# Patient Record
Sex: FEMALE | Race: OTHER | Employment: UNEMPLOYED | URBAN - METROPOLITAN AREA
[De-identification: names, ages, dates, MRNs, and addresses within clinical notes are randomized per-mention and may not be internally consistent; named-entity substitution may affect disease eponyms.]

---

## 2023-02-08 ENCOUNTER — APPOINTMENT (OUTPATIENT)
Dept: CT IMAGING | Age: 48
End: 2023-02-08

## 2023-02-08 ENCOUNTER — HOSPITAL ENCOUNTER (EMERGENCY)
Age: 48
Discharge: HOME HEALTH | End: 2023-02-08

## 2023-02-08 VITALS
SYSTOLIC BLOOD PRESSURE: 138 MMHG | OXYGEN SATURATION: 99 % | HEART RATE: 89 BPM | DIASTOLIC BLOOD PRESSURE: 83 MMHG | RESPIRATION RATE: 18 BRPM | TEMPERATURE: 98 F

## 2023-02-08 DIAGNOSIS — E04.1 THYROID NODULE: Primary | ICD-10-CM

## 2023-02-08 DIAGNOSIS — D50.9 MICROCYTIC ANEMIA: ICD-10-CM

## 2023-02-08 LAB
ALBUMIN SERPL-MCNC: 4.3 GM/DL (ref 3.4–5)
ALP BLD-CCNC: 87 IU/L (ref 40–128)
ALT SERPL-CCNC: 19 U/L (ref 10–40)
ANION GAP SERPL CALCULATED.3IONS-SCNC: 9 MMOL/L (ref 4–16)
AST SERPL-CCNC: 25 IU/L (ref 15–37)
BASOPHILS ABSOLUTE: 0 K/CU MM
BASOPHILS RELATIVE PERCENT: 0.4 % (ref 0–1)
BILIRUB SERPL-MCNC: 0.2 MG/DL (ref 0–1)
BUN SERPL-MCNC: 9 MG/DL (ref 6–23)
CALCIUM SERPL-MCNC: 9.3 MG/DL (ref 8.3–10.6)
CHLORIDE BLD-SCNC: 101 MMOL/L (ref 99–110)
CHP ED QC CHECK: NORMAL
CO2: 27 MMOL/L (ref 21–32)
CREAT SERPL-MCNC: 0.8 MG/DL (ref 0.6–1.1)
DIFFERENTIAL TYPE: ABNORMAL
EOSINOPHILS ABSOLUTE: 0.1 K/CU MM
EOSINOPHILS RELATIVE PERCENT: 1.8 % (ref 0–3)
GFR SERPL CREATININE-BSD FRML MDRD: >60 ML/MIN/1.73M2
GLUCOSE SERPL-MCNC: 120 MG/DL (ref 70–99)
HCT VFR BLD CALC: 34.9 % (ref 37–47)
HEMOGLOBIN: 10.2 GM/DL (ref 12.5–16)
IMMATURE NEUTROPHIL %: 0.2 % (ref 0–0.43)
LYMPHOCYTES ABSOLUTE: 2.1 K/CU MM
LYMPHOCYTES RELATIVE PERCENT: 38.4 % (ref 24–44)
MCH RBC QN AUTO: 22.5 PG (ref 27–31)
MCHC RBC AUTO-ENTMCNC: 29.2 % (ref 32–36)
MCV RBC AUTO: 76.9 FL (ref 78–100)
MONOCYTES ABSOLUTE: 0.5 K/CU MM
MONOCYTES RELATIVE PERCENT: 9.3 % (ref 0–4)
NUCLEATED RBC %: 0 %
PDW BLD-RTO: 18.4 % (ref 11.7–14.9)
PLATELET # BLD: 219 K/CU MM (ref 140–440)
PMV BLD AUTO: 10.2 FL (ref 7.5–11.1)
POTASSIUM SERPL-SCNC: 3.8 MMOL/L (ref 3.5–5.1)
PREGNANCY TEST URINE, POC: NEGATIVE
PREGNANCY TEST URINE, POC: NEGATIVE
RBC # BLD: 4.54 M/CU MM (ref 4.2–5.4)
SEGMENTED NEUTROPHILS ABSOLUTE COUNT: 2.8 K/CU MM
SEGMENTED NEUTROPHILS RELATIVE PERCENT: 49.9 % (ref 36–66)
SODIUM BLD-SCNC: 137 MMOL/L (ref 135–145)
TOTAL IMMATURE NEUTOROPHIL: 0.01 K/CU MM
TOTAL NUCLEATED RBC: 0 K/CU MM
TOTAL PROTEIN: 7.8 GM/DL (ref 6.4–8.2)
TSH SERPL DL<=0.005 MIU/L-ACNC: 0.67 UIU/ML (ref 0.27–4.2)
WBC # BLD: 5.6 K/CU MM (ref 4–10.5)

## 2023-02-08 PROCEDURE — 85025 COMPLETE CBC W/AUTO DIFF WBC: CPT

## 2023-02-08 PROCEDURE — 6360000004 HC RX CONTRAST MEDICATION: Performed by: PHYSICIAN ASSISTANT

## 2023-02-08 PROCEDURE — 99285 EMERGENCY DEPT VISIT HI MDM: CPT

## 2023-02-08 PROCEDURE — 84443 ASSAY THYROID STIM HORMONE: CPT

## 2023-02-08 PROCEDURE — 70491 CT SOFT TISSUE NECK W/DYE: CPT

## 2023-02-08 PROCEDURE — 80053 COMPREHEN METABOLIC PANEL: CPT

## 2023-02-08 PROCEDURE — 81025 URINE PREGNANCY TEST: CPT

## 2023-02-08 RX ADMIN — IOPAMIDOL 80 ML: 755 INJECTION, SOLUTION INTRAVENOUS at 12:33

## 2023-02-08 ASSESSMENT — ENCOUNTER SYMPTOMS
VOICE CHANGE: 0
TROUBLE SWALLOWING: 0
SORE THROAT: 0
RHINORRHEA: 0

## 2023-02-08 NOTE — DISCHARGE INSTRUCTIONS
Your CAT scan findings today do show a 3.5 cm nodule in your thyroid. This needs further evaluation to determine if this is cancer or not cancer. If there is cancer, this can be treated. Please follow-up with  the cancer center to  schedule an outpatient ultrasound of your thyroid. Follow-up with a primary care provider this weekfor reevaluation of your symptoms and discuss further outpatient management of further healthcare needs. Return to emergency department if you develop any neck swelling, difficulty breathing, difficulty swallowing, fevers, worsening symptoms or any new concerns.

## 2023-02-08 NOTE — ED PROVIDER NOTES
[address] ENCOUNTER        Pt Name: Rusty Kimbrough  MRN: [id number]  Rufinagfelisa 1975  Date of evaluation: 2/8/2023  Provider: Rtio Pearson PA-C  PCP: No primary care provider on file. BRIANA. I have evaluated this patient. My supervising physician was available for consultation. CHIEF COMPLAINT       Chief Complaint   Patient presents with    Other     Thyroid swelling       HISTORY OF PRESENT ILLNESS: 1 or more Elements     History from : Patient    Limitations to history : Language- [place] Creole Speaking    Rusty Kimbrough is a 56 y.o. female who presents c/o of mass in neck. She was advised to come in today by her , due to issue with her neck. She mentions it has been there 7-8 years, mentioning that she came in today because she did not have to work. She describes it as feeling it from inside. She feels it when she swallows. Has noticed it going up and down when she swallows. She feels that it is getting bigger gradually. She has not noticed any throat swelling. Has not had any previous evaluations. Mom had similar situation and had to have surgery. She denies any hoarseness, changes to her voice, difficulty swallowing, sore throat, neck pain, facial swelling, difficulty breathing, chest pain, skin rashes or lesions, extremity weakness, numbness tingling swelling, fevers, night sweats, weight loss, fatigue    LMP: last month  Meds: takes multivitamin,  Denies any PMHX  SocHx: denies smoking; etoh beer on weekends occasionally       Nursing Notes were all reviewed and agreed with or any disagreements were addressed in the HPI. REVIEW OF SYSTEMS :      Review of Systems   Constitutional:  Negative for fever and unexpected weight change. HENT:  Negative for rhinorrhea, sore throat, trouble swallowing and voice change.     Eyes:  Negative for visual disturbance. Musculoskeletal:  Negative for joint swelling and myalgias. Skin:  Negative for rash. Neurological:  Negative for speech difficulty, weakness and headaches. Hematological:  Negative for adenopathy. Positives and Pertinent negatives as per HPI. SURGICAL HISTORY   No past surgical history on file. CURRENTMEDICATIONS       Previous Medications    No medications on file       ALLERGIES     Patient has no known allergies. FAMILYHISTORY     No family history on file. SOCIAL HISTORY       Social History     Tobacco Use    Smoking status: Never    Smokeless tobacco: Never   Substance Use Topics    Alcohol use: Yes     Comment: weekends    Drug use: Never       SCREENINGS        Avinger Coma Scale  Eye Opening: Spontaneous  Best Verbal Response: Oriented  Best Motor Response: Obeys commands  Avinger Coma Scale Score: 15                CIWA Assessment  BP: 138/83  Heart Rate: 89               PHYSICAL EXAM  1 or more Elements     ED Triage Vitals [02/08/23 1011]   BP Temp Temp Source Heart Rate Resp SpO2 Height Weight   (!) 141/84 98 °F (36.7 °C) Oral (!) 105 18 100 % -- --       Physical Exam  Vitals and nursing note reviewed. Constitutional:       Appearance: Normal appearance. She is well-developed. She is not ill-appearing or diaphoretic. HENT:      Head: Normocephalic and atraumatic. Eyes:      General: No scleral icterus. Right eye: No discharge. Left eye: No discharge. Neck:      Thyroid: Thyroid mass present. Vascular: No hepatojugular reflux. Trachea: Trachea and phonation normal.   Cardiovascular:      Rate and Rhythm: Normal rate and regular rhythm. Heart sounds: Normal heart sounds. No murmur heard. No friction rub. No gallop. Pulmonary:      Effort: Pulmonary effort is normal. No respiratory distress. Breath sounds: Normal breath sounds. No stridor. No wheezing or rales. Chest:      Chest wall: No tenderness.    Abdominal: General: Bowel sounds are normal. There is no distension. Palpations: Abdomen is soft. There is no mass. Tenderness: There is no abdominal tenderness. There is no guarding or rebound. Musculoskeletal:         General: No tenderness. Normal range of motion. Cervical back: Full passive range of motion without pain and neck supple. No rigidity, torticollis or crepitus. No spinous process tenderness. Lymphadenopathy:      Cervical: No cervical adenopathy. Skin:     General: Skin is warm and dry. Coloration: Skin is not pale. Neurological:      Mental Status: She is alert and oriented to person, place, and time. Coordination: Coordination normal.   Psychiatric:         Mood and Affect: Mood normal.         Behavior: Behavior normal.       EMERGENCY DEPARTMENT COURSE and DIFFERENTIAL DIAGNOSIS/MDM:     Vitals:    02/08/23 1011 02/08/23 1042   BP: (!) 141/84 138/83   Pulse: (!) 105 89   Resp: 18    Temp: 98 °F (36.7 °C)    TempSrc: Oral    SpO2: 100% 99%       Pt is a 56 y.o. female who presents with above HPI. Nursing notes and vital signs reviewed. Patient speaks [employer], video  utilized with history and examination, and plan. Patient seen and examined she is alert and oriented no acute distress. Normal posterior oropharynx, no gingival swelling. She does have what appears to be firm symmetric area on her anterior neck, nonfluctuant, no ulcerations or erythema. Otherwise no cervical adenopathy, supraclavicular mass. She is denying any significant past medical history systemic symptoms, and on examination she appears quite well. Initial heart rate was 105 immediately upon arrival, but reevaluation at bedside, her vitals are within normal limits. She does not have a primary care provider and does have some social issues with limited [place] and [place]. At this point, we will plan for basic lab work, TSH, and CT imaging.     @ 1:12 PM EST: Patient's lab work appears mild anemia, otherwise appears to be unremarkable. CTA soft tissue neck with contrast, showing heterogeneous dominant thyroid nodule, 2.5 x 3.3 x 3.4 cm concerning for neoplastic process recommendations for thyroid ultrasound. Discussed with case management to assist with outpatient follow up treatment. Per  ANI Yo she had reached out to the cancer center to discuss patient's need for ultrasound. Mentions they could be scheduled there to see a provider to have the testing done and the provider can follow-up with any further treatment. @ 1:33 PM EST:  Discussed work up with patient including mild anemia, and results of CT scan. Specifically need for further evaluation, outpatient thyroid ultrasound, and need for PCP. Additionally discussed recommendations to follow-up with primary care provider for reevaluation of patient's blood work, blood pressure and ongoing wellness evaluations. Patient verbalized understanding is agreeable to this plan. patient was given thefollowing medications:  Medications   iopamidol (ISOVUE-370) 76 % injection 80 mL (80 mLs IntraVENous Given 2/8/23 1233)             CONSULTS: (Who and What was discussed, see also below)  IP CONSULT TO CASE MANAGEMENT  Discussion with Other Professionals : As indicated in SUMMARY, ED COURSE AND REASSESSMENT, MDM and Case Management         CC/HPI SUMMARY, DDX, ED COURSE, AND REASSESSMENT, MDM:     Patient with history as above presented with Other (Thyroid swelling)    Patient was nontoxic, stable. History and Exam as above. Imaging Reviewed. I reviewed external records. Differential diagnosis considered. Overall presentation is concerning for cancer, however at this point, no concern for airway compromise.     Consideration was given for ED ultrasound or admission, however patient appropriate for outpatient management symptoms have been gradual over the past several years, and no acute distress with normal vitals    Case management was consulted to assist with outpatient follow-up. Case management did reach out to cancer center to discuss patient's presentation and need for ultrasound neck and appointment was scheduled for patient to be seen there by one of the providers and have additional testing. Also discussed the potential for follow-up with primary care provider, however patient is currently not established nor has insurance. Outpatient primary care contact information was provided as well. With assistance of video  I did have a detailed discussion with patient regarding work-up and need for outpatient follow-up including concern for possible malignancy and need for outpatient follow-up as well as return precautions to return with any worsening symptoms or acute distress. She verbalized understanding is agreeable this plan. Disposition: Discussed need to follow up diagnostics, including incidental findings. Discharged with instructions to obtain outpatient follow up of patient's symptoms and findings, with strict return precautions if patient develops new or worsening symptoms. Follow-up plan and return precautions were provided and discussed in detail patient in agreement. DDx: benign thyroid nodule , thyroid inflammation, metastatic disease, primary thyroid CA, medullary thyroid cancer, congenital mass, thymic cyst, lymphadenopathy, lymphoma, lipoma,     Disposition Considerations (tests considered but not done, Admit vs D/C, Shared Decision Making, Pt Expectation of Test or Tx.): none (unless indicated in ED Course, Summary, Reassessment, or MDM      Appropriate for outpatient management       Is this patient to be included in the SEP-1 Core Measure due to severe sepsis or septic shock?    No   Exclusion criteria - the patient is NOT to be included for SEP-1 Core Measure due to:  2+ SIRS criteria are not met    Chronic Conditions affecting care:    has no past medical history on file. Social Determinants : Patient does not have insurance and limited English, speaks [facility]  Payor: /         Records Reviewed : None    I am the Primary Clinician of Record. DIAGNOSTIC RESULTS   LABS:    Labs Reviewed   CBC WITH AUTO DIFFERENTIAL - Abnormal; Notable for the following components:       Result Value    Hemoglobin 10.2 (*)     Hematocrit 34.9 (*)     MCV 76.9 (*)     MCH 22.5 (*)     MCHC 29.2 (*)     RDW 18.4 (*)     Monocytes % 9.3 (*)     All other components within normal limits   COMPREHENSIVE METABOLIC PANEL - Abnormal; Notable for the following components:    Glucose 120 (*)     All other components within normal limits   POCT URINE PREGNANCY - Normal   TSH   POC PREGNANCY UR-QUAL       When ordered only abnormal lab results are displayed. All other labs were within normal range or not returned as of this dictation. EKG: When ordered, EKG's are interpreted by the Emergency Department Physician in the absence of a cardiologist.  Please see their note for interpretation of EKG. RADIOLOGY:   Non-plain film images such as CT, Ultrasound and MRI are read by the radiologist. Plain radiographic images are visualized and preliminarily interpreted by the ED Provider with the below findings:      Interpretation per the Radiologist below, if available at the time of this note:    CT SOFT TISSUE NECK W CONTRAST    Result Date: 2/8/2023  EXAMINATION: CT OF THE NECK SOFT TISSUE WITH CONTRAST  2/8/2023 TECHNIQUE: CT of the neck was performed with the administration of intravenous contrast. Multiplanar reformatted images are provided for review. Automated exposure control, iterative reconstruction, and/or weight based adjustment of the mA/kV was utilized to reduce the radiation dose to as low as reasonably achievable. COMPARISON: None.  HISTORY: ORDERING SYSTEM PROVIDED HISTORY: possible anterior neck swelling, throat mass/sensation in throat;, worse with swallowing, x years, but worsening TECHNOLOGIST PROVIDED HISTORY: Reason for exam:->possible anterior neck swelling, throat mass/sensation in throat;, worse with swallowing, x years, but worsening Decision Support Exception - unselect if not a suspected or confirmed emergency medical condition->Emergency Medical Condition (MA) Reason for Exam: possible anterior neck swelling, throat mass/sensation in throat;, worse with swallowing, x years, but worsening FINDINGS: PHARYNX/LARYNX:  The nasopharynx, oropharynx and hypopharynx are normal in appearance. The epiglottis and aryepiglottic folds are normal.  The tongue is normal in appearance. There is no soft tissue mass identified. There is no airway narrowing. SALIVARY GLANDS/THYROID:  There is a heterogeneous 2.5 x 3.3 x 3.4 cm mass within the thyroid isthmus extending to the left of midline. The mass demonstrates heterogeneous density and several punctate calcifications. This is concerning for a neoplastic process. The parotid glands and submandibular glands are normal in appearance. LYMPH NODES:  No cervical or supraclavicular lymphadenopathy is seen. SOFT TISSUES:  No appreciable soft tissue swelling or mass is seen. BRAIN/ORBITS/SINUSES:  Limited evaluation the brain and orbits is unremarkable. There is mucosal thickening within the left maxillary sinus. The paranasal sinuses and mastoid air cells are otherwise clear. LUNG APICES/SUPERIOR MEDIASTINUM:  The lung apices are clear. No superior mediastinal lymphadenopathy is identified. BONES:  No lytic or blastic osseous lesions are identified. Heterogeneous dominant thyroid nodule measuring 2.5 x 3.3 x 3.4 cm. This is concerning for a neoplastic process. Thyroid ultrasound is recommended, per the recommendations below. No pathologically enlarged lymphadenopathy. RECOMMENDATIONS: 3.4 cm incidental left thyroid nodule. Recommend thyroid US. Reference: J Am Calli Radiol.  2015 Feb;12(2): 143-50       CT SOFT TISSUE NECK W CONTRAST Final Result   Heterogeneous dominant thyroid nodule measuring 2.5 x 3.3 x 3.4 cm. This is   concerning for a neoplastic process. Thyroid ultrasound is recommended, per   the recommendations below. No pathologically enlarged lymphadenopathy. RECOMMENDATIONS:   3.4 cm incidental left thyroid nodule. Recommend thyroid US. Reference: J Am Calli Radiol. 2015 Feb;12(2): 143-50           No results found. No results found. PROCEDURES   Unless otherwise noted below, none     Procedures    CRITICAL CARE TIME (.cctime)       PAST MEDICAL HISTORY      has no past medical history on file. FINAL IMPRESSION      1. Thyroid nodule    2. Microcytic anemia          DISPOSITION/PLAN     DISPOSITION Discharge - Pending Orders Complete 02/08/2023 01:50:35 PM      PATIENT REFERRED TO:  Jonathan Leggett MD  [address]  [phone]    Call   For reevaluation    [facility] - ADULT  [address] [address] [phone]  Call   As needed    [facility]  [facility] [place] [address]  [phone]  Schedule an appointment as soon as possible for a visit in 1 week  Call to schedule an appointment for ultrasound and further managment.     DISCHARGE MEDICATIONS:  New Prescriptions    No medications on file       DISCONTINUED MEDICATIONS:  Discontinued Medications    No medications on file              (Please note that portions of this note were completed with a voice recognition program.  Efforts were made to edit the dictations but occasionally words are mis-transcribed.)    Santana Castellon PA-C (electronically signed)      Santana Castellon PA-C  02/08/23 2036

## 2023-02-08 NOTE — CARE COORDINATION
CM received consult from [facility] for patient in room #5. CM was advised that patient in need of oncology follow up. CM was advised that patient does not speak [place] and does not currently have insurance. CM reviewed patient documentation. CM noted patient does not currently have PCP, insurance or SSN. CM placed telephone call to 711 Eduin Estrada RN with the [facility]. CM was advised that referral needs to be placed and follow up call will be made to patient. CM was advised that patient will be scheduled with first available physician. CM updated [facility] on above information. Stew Hewitt PA-C requested CM attempt to schedule follow up appointment prior to patient discharge. 1330 CM placed additional call to [facility]. No answer. Voicemail message left requesting return call. 1448 CM placed call to [facility]. CM spoke with Rosemarie Bonilla who advised that [place] typically schedules appointments, however she is currently in a meeting. CM advised Rosemarie Bonilla that patient needs follow up appointment scheduled prior to being discharged from the ER. Appointment scheduled for Wednesday February 22 at 2:30 with Dr Lulú Bullock. CM met with patient to provide appointment information. CM spoke with patient with assistance from [address] [place] [address]. CM provided patient with appointment date and time as well as address and phone number for [facility]. Patient verbalized understanding. Denies any additional CM needs or questions.

## 2023-02-22 ENCOUNTER — HOSPITAL ENCOUNTER (OUTPATIENT)
Dept: INFUSION THERAPY | Age: 48
Discharge: HOME | End: 2023-02-22

## 2023-02-22 ENCOUNTER — INITIAL CONSULT (OUTPATIENT)
Dept: ONCOLOGY | Age: 48
End: 2023-02-22

## 2023-02-22 VITALS
OXYGEN SATURATION: 99 % | WEIGHT: 219 LBS | HEART RATE: 96 BPM | TEMPERATURE: 97.4 F | DIASTOLIC BLOOD PRESSURE: 90 MMHG | BODY MASS INDEX: 36.49 KG/M2 | HEIGHT: 65 IN | SYSTOLIC BLOOD PRESSURE: 139 MMHG | RESPIRATION RATE: 16 BRPM

## 2023-02-22 DIAGNOSIS — E04.1 THYROID NODULE: ICD-10-CM

## 2023-02-22 LAB
BASOPHILS ABSOLUTE: 0 K/CU MM
BASOPHILS RELATIVE PERCENT: 0.2 % (ref 0–1)
DIFFERENTIAL TYPE: ABNORMAL
EOSINOPHILS ABSOLUTE: 0.1 K/CU MM
EOSINOPHILS RELATIVE PERCENT: 2 % (ref 0–3)
ERYTHROCYTE SEDIMENTATION RATE: 23 MM/HR (ref 0–20)
FERRITIN: 8 NG/ML (ref 15–150)
FOLATE SERPL-MCNC: 15.4 NG/ML (ref 3.1–17.5)
HCT VFR BLD CALC: 33.3 % (ref 37–47)
HEMOGLOBIN: 10 GM/DL (ref 12.5–16)
IRON: 22 UG/DL (ref 37–145)
LACTATE DEHYDROGENASE: 206 IU/L (ref 120–246)
LYMPHOCYTES ABSOLUTE: 1.9 K/CU MM
LYMPHOCYTES RELATIVE PERCENT: 38.3 % (ref 24–44)
MCH RBC QN AUTO: 23 PG (ref 27–31)
MCHC RBC AUTO-ENTMCNC: 30 % (ref 32–36)
MCV RBC AUTO: 76.7 FL (ref 78–100)
MONOCYTES ABSOLUTE: 0.6 K/CU MM
MONOCYTES RELATIVE PERCENT: 12.6 % (ref 0–4)
PCT TRANSFERRIN: 5 % (ref 10–44)
PDW BLD-RTO: 18.6 % (ref 11.7–14.9)
PLATELET # BLD: 289 K/CU MM (ref 140–440)
PMV BLD AUTO: 11.2 FL (ref 7.5–11.1)
RBC # BLD: 4.34 M/CU MM (ref 4.2–5.4)
SEGMENTED NEUTROPHILS ABSOLUTE COUNT: 2.4 K/CU MM
SEGMENTED NEUTROPHILS RELATIVE PERCENT: 46.9 % (ref 36–66)
TOTAL IRON BINDING CAPACITY: 440 UG/DL (ref 250–450)
UNSATURATED IRON BINDING CAPACITY: 418 UG/DL (ref 110–370)
VITAMIN B-12: 258.2 PG/ML (ref 211–911)
WBC # BLD: 5 K/CU MM (ref 4–10.5)

## 2023-02-22 PROCEDURE — 82728 ASSAY OF FERRITIN: CPT

## 2023-02-22 PROCEDURE — 85652 RBC SED RATE AUTOMATED: CPT

## 2023-02-22 PROCEDURE — 83550 IRON BINDING TEST: CPT

## 2023-02-22 PROCEDURE — 99211 OFF/OP EST MAY X REQ PHY/QHP: CPT

## 2023-02-22 PROCEDURE — 83540 ASSAY OF IRON: CPT

## 2023-02-22 PROCEDURE — 85025 COMPLETE CBC W/AUTO DIFF WBC: CPT

## 2023-02-22 PROCEDURE — 36415 COLL VENOUS BLD VENIPUNCTURE: CPT

## 2023-02-22 PROCEDURE — 83615 LACTATE (LD) (LDH) ENZYME: CPT

## 2023-02-22 PROCEDURE — 99204 OFFICE O/P NEW MOD 45 MIN: CPT | Performed by: INTERNAL MEDICINE

## 2023-02-22 PROCEDURE — 82607 VITAMIN B-12: CPT

## 2023-02-22 PROCEDURE — 82746 ASSAY OF FOLIC ACID SERUM: CPT

## 2023-02-22 RX ORDER — FERROUS SULFATE 325(65) MG
325 TABLET ORAL
Qty: 30 TABLET | Refills: 5 | Status: SHIPPED | OUTPATIENT
Start: 2023-02-22

## 2023-02-22 ASSESSMENT — PATIENT HEALTH QUESTIONNAIRE - PHQ9
1. LITTLE INTEREST OR PLEASURE IN DOING THINGS: 0
SUM OF ALL RESPONSES TO PHQ9 QUESTIONS 1 & 2: 0
2. FEELING DOWN, DEPRESSED OR HOPELESS: 0
SUM OF ALL RESPONSES TO PHQ QUESTIONS 1-9: 0

## 2023-02-22 NOTE — PROGRESS NOTES
Patient Name:  Melissa Wade  Patient :  1975  Patient MRN:  [id number]     Primary Oncologist: Moise Bennett MD  Referring Provider: No primary care provider on file. Date of Service: 2023      Reason for Consult: To evaluate the patient with thyroid nodule. Chief Complaint:    Chief Complaint   Patient presents with    New Patient     Patient Active Problem List:     Thyroid nodule    HPI:   Melissa Wade is a 60-year-old very pleasant female with no pertinent medical history, referred to me for evaluation of thyroid mass. She initially presented to [facility] ER on 2023 with mass in her neck. She stated that she has been noticing mass in her neck for last 7 to 8 years and it has gradually gotten bigger over time. She stated that her mom had similar issue and her mom had surgery for that. She doesn't know if it was thyroid origin or malignant process. CT soft tissue neck done on 23 showed heterogeneous dominant thyroid nodule measuring 2.5 x 3.3 x 3.4 cm. This is concerning for a neoplastic process. Radiologist recommend thyroid ultrasound. No pathologically enlarged lymph adenopathy. She denies any hoarseness, changes to her voice, difficulty swallowing, sore throat, neck pain, facial swelling, difficulty breathing, extremity weakness, drenching night sweats, itchiness, weight loss or palpable lymphadenopathy. Past Medical History:     No pertinent medical history. Past Surgery History:    No pertinent surgical history. Social History:   She denies smoking or illicit drug abuse. She socially drinks beer on the weekends. Family History:    No pertinent family history. No Known Allergies    Current Outpatient Medications on File Prior to Visit   Medication Sig Dispense Refill    Ibuprofen (ADVIL PO) Take by mouth as needed For headaches      NONFORMULARY Takes something for fatigue.  Unable to remember name       No current facility-administered medications on file prior to visit. Review of Systems:  Constitutional:  No weight loss, No fever, No chills, No night sweats. Energy level good. Eyes:  No diplopia, No transient or permanent loss of vision, No scotomata. ENT / Mouth:  No epistaxis, No dysphagia, No hoarseness, No oral ulcers, No gingival bleeding. No sore throat, No postnasal drip, No nasal drip, No mouth pain, No sinus pain, No tinnitus, Normal hearing. Cardiovascular:  No chest pain, No palpitations, No syncope, No upper extremity edema, No lower extremity edema, No calf discomfort. Respiratory:  No cough. No hemoptysis, No pleurisy, No wheezing, No dyspnea. Breast:  No breast mass, No pain, No nipple discharge, No change in size, No change in shape. Gastrointestinal:  No abdominal pain, No abdominal cramping, No nausea, No vomiting, No constipation, No diarrhea, No hematochezia, No melena, No jaundice, No dyspepsia, No dysphagia. Urinary:  No dysuria, No hematuria, No urinary incontinence. Gynecological:  No vaginal discharge, No suprapubic pain, No abnormal vaginal bleeding. (Female Patients Only)  Musculoskeletal:  No muscle pain, No swollen joints, No joint redness, No bone pain, No spine tenderness. Skin:  No rash, No nodules, No pruritus, No lesions. Neurologic:  No confusion, No seizures, No syncope, No tremor, No speech change, No headache, No hiccups, No abnormal gait, No sensory changes, No weakness. Psychiatric:  No depression, No anxiety, Concentration normal.  Endocrine:  No polyuria, No polydipsia, No hot flashes, No thyroid symptoms. Hematologic:  No epistaxis, No gingival bleeding, No petechiae, No ecchymosis. Lymphatic:  No lymphadenopathy, No lymphedema. Allergy / Immunologic:  No eczema, No frequent mucous infections, No frequent respiratory infections, No recurrent urticarial, No frequent skin infections.      Vital Signs: BP (!) 139/90 (Site: Right Upper Arm, Position: Sitting, Cuff Size: Large Adult)   Pulse 96   Temp 97.4 °F (36.3 °C) (Infrared)   Resp 16   Ht 5' 4.57\" (1.64 m)   Wt 219 lb (99.3 kg)   SpO2 99%   BMI 36.93 kg/m²      Physical Exam:  CONSTITUTIONAL: awake, alert, cooperative, no apparent distress   EYES: pupils equal, round and reactive to light, sclera clear, normal conjunctiva  ENT: Normocephalic, without obvious abnormality, atraumatic  NECK: about 4 cm thyroid nodule noted in mid neck region, no jugular venous distension, no carotid bruits   HEMATOLOGIC/LYMPHATIC: no cervical, supraclavicular or axillary lymphadenopathy   LUNGS: VBS, no wheezes, no increased work of breathing, no rhonchi, clear to auscultation, no crackles,    CARDIOVASCULAR: regular rate and rhythm, normal S1 and S2, no murmur noted  ABDOMEN: normal bowel sounds x 4, soft, non-distended, non-tender, no masses palpated, no hepatosplenomegaly   MUSCULOSKELETAL: full range of motion noted, tone is normal  NEUROLOGIC: awake, alert, oriented to name, place and time. Motor skills grossly intact. SKIN: appears intact, normal skin color, normal texture, normal turgor, no jaundice.    EXTREMITIES: no LE edema, no leg swelling, no cyanosis, no clubbing,       Labs:  Hematology:  Lab Results   Component Value Date    WBC 5.0 02/22/2023    RBC 4.34 02/22/2023    HGB 10.0 (L) 02/22/2023    HCT 33.3 (L) 02/22/2023    MCV 76.7 (L) 02/22/2023    MCH 23.0 (L) 02/22/2023    MCHC 30.0 (L) 02/22/2023    RDW 18.6 (H) 02/22/2023     02/22/2023    MPV 11.2 (H) 02/22/2023    SEGSPCT 46.9 02/22/2023    EOSRELPCT 2.0 02/22/2023    BASOPCT 0.2 02/22/2023    LYMPHOPCT 38.3 02/22/2023    MONOPCT 12.6 (H) 02/22/2023    SEGSABS 2.4 02/22/2023    EOSABS 0.1 02/22/2023    BASOSABS 0.0 02/22/2023    LYMPHSABS 1.9 02/22/2023    MONOSABS 0.6 02/22/2023    DIFFTYPE AUTOMATED DIFFERENTIAL 02/22/2023     No results found for: ESR  Chemistry:  Lab Results   Component Value Date     02/08/2023    K 3.8 02/08/2023     02/08/2023    CO2 27 02/08/2023    BUN 9 02/08/2023    CREATININE 0.8 02/08/2023    GLUCOSE 120 (H) 02/08/2023    CALCIUM 9.3 02/08/2023    PROT 7.8 02/08/2023    LABALBU 4.3 02/08/2023    BILITOT 0.2 02/08/2023    ALKPHOS 87 02/08/2023    AST 25 02/08/2023    ALT 19 02/08/2023    LABGLOM >60 02/08/2023     Lab Results   Component Value Date     02/22/2023     No components found for: LD  Lab Results   Component Value Date    TSHHS 0.669 02/08/2023     Immunology:  Lab Results   Component Value Date    PROT 7.8 02/08/2023     No results found for: Hearn Erp, KLFLCR  No results found for: B2M  Coagulation Panel:  No results found for: PROTIME, INR, APTT, DDIMER  Anemia Panel:  Lab Results   Component Value Date    MJABPZPX97 258.2 02/22/2023    FOLATE 15.4 02/22/2023     Tumor Markers:  No results found for: , CEA, , LABCA2, PSA     Observations:  PHQ-9 Total Score: 0 (2/22/2023  2:39 PM)     Assessment   Thyroid nodule  Microcytic hypochromic anemia    Plan:  Toy Bergman is a 69-year-old very pleasant who presented with mass in her neck for 7 - 8 years duration and it has gradually gotten bigger over time. Her mom had similar issue and her mom had surgery for that. CT soft tissue neck done on 2/8/23 showed heterogeneous dominant thyroid nodule measuring 2.5 x 3.3 x 3.4 cm. This is concerning for a neoplastic process. Radiologist recommend thyroid ultrasound. No pathologically enlarged lymph adenopathy. I reviewed her CT imaging with her. I agree with radiologist and I will request thyroid ultrasound to r/o thyroid cancer. If thyroid ultrasound is suspicious for malignancy, I will request US guided biopsy. Microcytic hypochromic anemia - most likely iron deficiency anemia. Will check iron study, B12, folate, LDH, ESR and retic count. Will start ferrous sulfate 325 mg daily today.      I answered all her questions and concerns for today. Thank you for allowing me to participate in the care of this very pleasant patient. Recent imaging and labs were reviewed and discussed with the patient.

## 2023-02-22 NOTE — PROGRESS NOTES
MA Rooming Questions  Patient: Clau Camilo  MRN: [id number]    Date: 2/22/2023        NP    5. Did the patient have a depression screening completed today?  Yes    No data recorded     PHQ-9 Given to (if applicable):               PHQ-9 Score (if applicable):                     [] Positive     [x]  Negative              Does question #9 need addressed (if applicable)                     [] Yes    []  No               Lyn Kidd MA